# Patient Record
Sex: FEMALE | Race: WHITE | NOT HISPANIC OR LATINO | URBAN - METROPOLITAN AREA
[De-identification: names, ages, dates, MRNs, and addresses within clinical notes are randomized per-mention and may not be internally consistent; named-entity substitution may affect disease eponyms.]

---

## 2020-06-23 ENCOUNTER — TELEPHONE ENCOUNTER (OUTPATIENT)
Dept: URBAN - METROPOLITAN AREA CLINIC 68 | Facility: CLINIC | Age: 80
End: 2020-06-23

## 2020-09-01 ENCOUNTER — OFFICE VISIT (OUTPATIENT)
Dept: URBAN - METROPOLITAN AREA CLINIC 68 | Facility: CLINIC | Age: 80
End: 2020-09-01

## 2021-01-01 ENCOUNTER — OFFICE VISIT (OUTPATIENT)
Dept: URBAN - METROPOLITAN AREA CLINIC 68 | Facility: CLINIC | Age: 81
End: 2021-01-01

## 2021-05-18 ENCOUNTER — OFFICE VISIT (OUTPATIENT)
Dept: URBAN - METROPOLITAN AREA CLINIC 68 | Facility: CLINIC | Age: 81
End: 2021-05-18

## 2021-05-25 ENCOUNTER — OFFICE VISIT (OUTPATIENT)
Dept: URBAN - METROPOLITAN AREA CLINIC 68 | Facility: CLINIC | Age: 81
End: 2021-05-25

## 2021-05-25 ENCOUNTER — TELEPHONE ENCOUNTER (OUTPATIENT)
Dept: URBAN - METROPOLITAN AREA CLINIC 68 | Facility: CLINIC | Age: 81
End: 2021-05-25

## 2021-06-06 ENCOUNTER — LAB OUTSIDE AN ENCOUNTER (OUTPATIENT)
Dept: URBAN - METROPOLITAN AREA CLINIC 68 | Facility: CLINIC | Age: 81
End: 2021-06-06

## 2021-06-08 ENCOUNTER — TELEPHONE ENCOUNTER (OUTPATIENT)
Dept: URBAN - METROPOLITAN AREA CLINIC 68 | Facility: CLINIC | Age: 81
End: 2021-06-08

## 2021-06-11 ENCOUNTER — OFFICE VISIT (OUTPATIENT)
Dept: URBAN - METROPOLITAN AREA CLINIC 68 | Facility: CLINIC | Age: 81
End: 2021-06-11

## 2021-08-23 ENCOUNTER — TELEPHONE ENCOUNTER (OUTPATIENT)
Dept: URBAN - METROPOLITAN AREA CLINIC 68 | Facility: CLINIC | Age: 81
End: 2021-08-23

## 2021-09-20 ENCOUNTER — OFFICE VISIT (OUTPATIENT)
Dept: URBAN - METROPOLITAN AREA CLINIC 68 | Facility: CLINIC | Age: 81
End: 2021-09-20

## 2021-09-22 ENCOUNTER — OFFICE VISIT (OUTPATIENT)
Dept: URBAN - METROPOLITAN AREA CLINIC 68 | Facility: CLINIC | Age: 81
End: 2021-09-22

## 2021-09-24 ENCOUNTER — OFFICE VISIT (OUTPATIENT)
Dept: URBAN - METROPOLITAN AREA SURGERY CENTER 12 | Facility: SURGERY CENTER | Age: 81
End: 2021-09-24

## 2021-09-27 ENCOUNTER — LAB OUTSIDE AN ENCOUNTER (OUTPATIENT)
Dept: URBAN - METROPOLITAN AREA CLINIC 68 | Facility: CLINIC | Age: 81
End: 2021-09-27

## 2021-09-27 LAB — 01: (no result)

## 2021-09-29 ENCOUNTER — TELEPHONE ENCOUNTER (OUTPATIENT)
Dept: URBAN - METROPOLITAN AREA CLINIC 68 | Facility: CLINIC | Age: 81
End: 2021-09-29

## 2021-10-06 ENCOUNTER — OFFICE VISIT (OUTPATIENT)
Dept: URBAN - METROPOLITAN AREA CLINIC 68 | Facility: CLINIC | Age: 81
End: 2021-10-06

## 2021-10-12 ENCOUNTER — TELEPHONE ENCOUNTER (OUTPATIENT)
Dept: URBAN - METROPOLITAN AREA CLINIC 68 | Facility: CLINIC | Age: 81
End: 2021-10-12

## 2022-06-04 ENCOUNTER — TELEPHONE ENCOUNTER (OUTPATIENT)
Dept: URBAN - METROPOLITAN AREA CLINIC 68 | Facility: CLINIC | Age: 82
End: 2022-06-04

## 2022-06-04 RX ORDER — ALUMINUM HYDROXIDE AND MAGNESIUM TRISILICATE 80; 14.2 MG/1; MG/1
TABLET, CHEWABLE ORAL
Qty: 60 | Refills: 0 | OUTPATIENT
Start: 2021-09-22 | End: 2021-10-22

## 2022-06-04 RX ORDER — PANTOPRAZOLE SODIUM 40 MG/1
TABLET, DELAYED RELEASE ORAL
Qty: 60 | Refills: 0 | OUTPATIENT
Start: 2018-07-25 | End: 2018-08-24

## 2022-06-04 RX ORDER — SUCRALFATE 1 G/1
TABLET ORAL
Qty: 120 | Refills: 120 | OUTPATIENT
Start: 2019-10-23 | End: 2021-09-22

## 2022-06-05 ENCOUNTER — TELEPHONE ENCOUNTER (OUTPATIENT)
Dept: URBAN - METROPOLITAN AREA CLINIC 68 | Facility: CLINIC | Age: 82
End: 2022-06-05

## 2022-06-05 RX ORDER — ZOLPIDEM TARTRATE 5 MG/1
ZOLPIDEM TARTRATE( 5MG ORAL  AT BEDTIME ) ACTIVE -HX ENTRY TABLET, FILM COATED ORAL AT BEDTIME
Status: ACTIVE | COMMUNITY
Start: 2021-09-22

## 2022-06-05 RX ORDER — BACLOFEN 10 MG/1
TABLET ORAL
Qty: 90 | Refills: 90 | Status: ACTIVE | COMMUNITY
Start: 2021-09-22

## 2022-06-05 RX ORDER — VIT A/VIT C/VIT E/ZINC/COPPER 2148-113
PRESERVISION AREDS(  ORAL   ) ACTIVE -HX ENTRY TABLET ORAL
Status: ACTIVE | COMMUNITY
Start: 2021-09-22

## 2022-06-05 RX ORDER — PANTOPRAZOLE SODIUM 40 MG/1
TABLET, DELAYED RELEASE ORAL TWICE A DAY
Qty: 180 | Refills: 18 | Status: ACTIVE | COMMUNITY
Start: 2021-06-18

## 2022-06-25 ENCOUNTER — TELEPHONE ENCOUNTER (OUTPATIENT)
Age: 82
End: 2022-06-25

## 2022-06-25 RX ORDER — FAMOTIDINE 10 MG
ZANTAC( 150MG ORAL   AT BEDTIME AND AS NEEDED ) INACTIVE -HX ENTRY TABLET ORAL
OUTPATIENT
Start: 2019-06-26

## 2022-06-25 RX ORDER — SUCRALFATE 1 G/1
TABLET ORAL
Qty: 120 | Refills: 120 | OUTPATIENT
Start: 2019-10-23 | End: 2021-09-22

## 2022-06-25 RX ORDER — PANTOPRAZOLE 40 MG/1
TABLET, DELAYED RELEASE ORAL
Qty: 60 | Refills: 0 | OUTPATIENT
Start: 2018-07-25 | End: 2018-08-24

## 2022-06-26 ENCOUNTER — TELEPHONE ENCOUNTER (OUTPATIENT)
Age: 82
End: 2022-06-26

## 2022-06-26 RX ORDER — ZOLPIDEM TARTRATE 5 MG/1
ZOLPIDEM TARTRATE( 5MG ORAL  AT BEDTIME ) ACTIVE -HX ENTRY TABLET ORAL AT BEDTIME
Status: ACTIVE | COMMUNITY
Start: 2021-09-22

## 2022-06-26 RX ORDER — VIT A/VIT C/VIT E/ZINC/COPPER 2148-113
PRESERVISION AREDS(  ORAL   ) ACTIVE -HX ENTRY TABLET ORAL
Status: ACTIVE | COMMUNITY
Start: 2021-09-22

## 2022-06-26 RX ORDER — BACLOFEN 10 MG/1
TABLET ORAL
Qty: 90 | Refills: 90 | Status: ACTIVE | COMMUNITY
Start: 2021-09-22

## 2022-06-26 RX ORDER — PANTOPRAZOLE 40 MG/1
TABLET, DELAYED RELEASE ORAL TWICE A DAY
Qty: 180 | Refills: 18 | Status: ACTIVE | COMMUNITY
Start: 2021-06-18

## 2023-01-04 ENCOUNTER — OFFICE VISIT (OUTPATIENT)
Dept: URBAN - METROPOLITAN AREA CLINIC 68 | Facility: CLINIC | Age: 83
End: 2023-01-04

## 2023-01-11 ENCOUNTER — OFFICE VISIT (OUTPATIENT)
Dept: URBAN - METROPOLITAN AREA CLINIC 68 | Facility: CLINIC | Age: 83
End: 2023-01-11

## 2023-01-11 RX ORDER — PANTOPRAZOLE SODIUM 40 MG/1
TABLET, DELAYED RELEASE ORAL TWICE A DAY
Qty: 180 | Refills: 18 | Status: ACTIVE | COMMUNITY
Start: 2021-06-18

## 2023-01-11 RX ORDER — PANTOPRAZOLE SODIUM 40 MG/1
1 TABLET TABLET, DELAYED RELEASE ORAL TWICE A DAY
Qty: 180 TABLET | Refills: 3
Start: 2021-06-18

## 2023-01-11 RX ORDER — FAMOTIDINE 20 MG/1
1 TABLET AT BEDTIME AS NEEDED TABLET, FILM COATED ORAL ONCE A DAY
OUTPATIENT

## 2023-01-11 RX ORDER — FAMOTIDINE 20 MG/1
1 TABLET AT BEDTIME AS NEEDED TABLET, FILM COATED ORAL ONCE A DAY
Status: ACTIVE | COMMUNITY

## 2023-01-11 RX ORDER — ZOLPIDEM TARTRATE 5 MG/1
ZOLPIDEM TARTRATE( 5MG ORAL  AT BEDTIME ) ACTIVE -HX ENTRY TABLET, FILM COATED ORAL AT BEDTIME
Status: ACTIVE | COMMUNITY
Start: 2021-09-22

## 2023-01-11 RX ORDER — VIT A/VIT C/VIT E/ZINC/COPPER 2148-113
PRESERVISION AREDS(  ORAL   ) ACTIVE -HX ENTRY TABLET ORAL
Status: DISCONTINUED | COMMUNITY
Start: 2021-09-22

## 2023-01-11 RX ORDER — BACLOFEN 10 MG/1
TABLET ORAL
Qty: 90 | Refills: 90 | Status: DISCONTINUED | COMMUNITY
Start: 2021-09-22

## 2023-01-11 NOTE — HPI-MIGRATED HPI
General : DR Kelsey pt, had LINX procedure, Dr Nereyda Ruth  saw Dr Law  and Nereyda  Was taking meds twice daily then pt changed to once daily and famotidine in pm and symptoms are worse

## 2023-01-18 ENCOUNTER — OFFICE VISIT (OUTPATIENT)
Dept: URBAN - METROPOLITAN AREA CLINIC 68 | Facility: CLINIC | Age: 83
End: 2023-01-18

## 2023-01-23 ENCOUNTER — DASHBOARD ENCOUNTERS (OUTPATIENT)
Age: 83
End: 2023-01-23

## 2023-01-23 ENCOUNTER — OFFICE VISIT (OUTPATIENT)
Dept: URBAN - METROPOLITAN AREA CLINIC 68 | Facility: CLINIC | Age: 83
End: 2023-01-23

## 2023-01-23 ENCOUNTER — TELEPHONE ENCOUNTER (OUTPATIENT)
Dept: URBAN - METROPOLITAN AREA CLINIC 68 | Facility: CLINIC | Age: 83
End: 2023-01-23

## 2023-01-23 RX ORDER — PANTOPRAZOLE SODIUM 40 MG/1
1 TABLET TABLET, DELAYED RELEASE ORAL TWICE A DAY
OUTPATIENT
Start: 2021-06-18

## 2023-01-23 RX ORDER — DEXLANSOPRAZOLE 60 MG/1
1 CAPSULE CAPSULE, DELAYED RELEASE ORAL ONCE A DAY
Qty: 30 | Refills: 6 | OUTPATIENT
Start: 2023-01-23

## 2023-01-23 RX ORDER — PANTOPRAZOLE SODIUM 40 MG/1
1 TABLET TABLET, DELAYED RELEASE ORAL TWICE A DAY
Qty: 180 TABLET | Refills: 3 | Status: ACTIVE | COMMUNITY
Start: 2021-06-18

## 2023-01-23 RX ORDER — ZOLPIDEM TARTRATE 5 MG/1
ZOLPIDEM TARTRATE( 5MG ORAL  AT BEDTIME ) ACTIVE -HX ENTRY TABLET, FILM COATED ORAL AT BEDTIME
Status: ACTIVE | COMMUNITY
Start: 2021-09-22

## 2023-01-23 NOTE — HPI-MIGRATED HPI
General : 1/23  PERSIST ANT pain , heartburn  , eats less , nausea and burning in tongue and ears ,  Takes pantoprazole twice daily  Link procedure in 2018 in [place] , eventually helped? not clear, then was told needed dilation , done by Ani at [facility], not much better  PREVIOUSLY  DR Kelsey pt, had LINX procedure, Dr Dawn  Ruth  saw Dr Law  and Nereyda  Was taking meds twice daily then pt changed to once daily and famotidine in pm and symptoms are worse

## 2023-02-14 ENCOUNTER — OFFICE VISIT (OUTPATIENT)
Dept: URBAN - METROPOLITAN AREA SURGERY CENTER 12 | Facility: SURGERY CENTER | Age: 83
End: 2023-02-14